# Patient Record
Sex: FEMALE | Race: WHITE | NOT HISPANIC OR LATINO | URBAN - METROPOLITAN AREA
[De-identification: names, ages, dates, MRNs, and addresses within clinical notes are randomized per-mention and may not be internally consistent; named-entity substitution may affect disease eponyms.]

---

## 2024-03-28 ENCOUNTER — EMERGENCY (EMERGENCY)
Facility: HOSPITAL | Age: 19
LOS: 1 days | Discharge: HOME | End: 2024-03-28
Attending: EMERGENCY MEDICINE | Admitting: EMERGENCY MEDICINE
Payer: COMMERCIAL

## 2024-03-28 VITALS
SYSTOLIC BLOOD PRESSURE: 105 MMHG | RESPIRATION RATE: 16 BRPM | HEART RATE: 72 BPM | TEMPERATURE: 98 F | OXYGEN SATURATION: 98 % | DIASTOLIC BLOOD PRESSURE: 80 MMHG

## 2024-03-28 LAB
ALBUMIN SERPL ELPH-MCNC: 4.1 G/DL — SIGNIFICANT CHANGE UP (ref 3.4–5)
ALP SERPL-CCNC: 63 U/L — SIGNIFICANT CHANGE UP (ref 40–120)
ALT FLD-CCNC: 18 U/L — SIGNIFICANT CHANGE UP (ref 12–42)
ANION GAP SERPL CALC-SCNC: 11 MMOL/L — SIGNIFICANT CHANGE UP (ref 9–16)
AST SERPL-CCNC: 19 U/L — SIGNIFICANT CHANGE UP (ref 15–37)
BASOPHILS # BLD AUTO: 0.05 K/UL — SIGNIFICANT CHANGE UP (ref 0–0.2)
BASOPHILS NFR BLD AUTO: 0.5 % — SIGNIFICANT CHANGE UP (ref 0–2)
BILIRUB SERPL-MCNC: 0.2 MG/DL — SIGNIFICANT CHANGE UP (ref 0.2–1.2)
BUN SERPL-MCNC: 13 MG/DL — SIGNIFICANT CHANGE UP (ref 7–23)
CALCIUM SERPL-MCNC: 8.7 MG/DL — SIGNIFICANT CHANGE UP (ref 8.5–10.5)
CHLORIDE SERPL-SCNC: 103 MMOL/L — SIGNIFICANT CHANGE UP (ref 96–108)
CO2 SERPL-SCNC: 26 MMOL/L — SIGNIFICANT CHANGE UP (ref 22–31)
CREAT SERPL-MCNC: 0.84 MG/DL — SIGNIFICANT CHANGE UP (ref 0.5–1.3)
EGFR: 102 ML/MIN/1.73M2 — SIGNIFICANT CHANGE UP
EOSINOPHIL # BLD AUTO: 0.05 K/UL — SIGNIFICANT CHANGE UP (ref 0–0.5)
EOSINOPHIL NFR BLD AUTO: 0.5 % — SIGNIFICANT CHANGE UP (ref 0–6)
ETHANOL SERPL-MCNC: 387 MG/DL — HIGH
GLUCOSE SERPL-MCNC: 113 MG/DL — HIGH (ref 70–99)
HCG SERPL-ACNC: <1 MIU/ML — SIGNIFICANT CHANGE UP
HCT VFR BLD CALC: 38.7 % — SIGNIFICANT CHANGE UP (ref 34.5–45)
HGB BLD-MCNC: 12.9 G/DL — SIGNIFICANT CHANGE UP (ref 11.5–15.5)
IMM GRANULOCYTES NFR BLD AUTO: 0.1 % — SIGNIFICANT CHANGE UP (ref 0–0.9)
LYMPHOCYTES # BLD AUTO: 3.65 K/UL — HIGH (ref 1–3.3)
LYMPHOCYTES # BLD AUTO: 37.5 % — SIGNIFICANT CHANGE UP (ref 13–44)
MCHC RBC-ENTMCNC: 29.1 PG — SIGNIFICANT CHANGE UP (ref 27–34)
MCHC RBC-ENTMCNC: 33.3 GM/DL — SIGNIFICANT CHANGE UP (ref 32–36)
MCV RBC AUTO: 87.2 FL — SIGNIFICANT CHANGE UP (ref 80–100)
MONOCYTES # BLD AUTO: 0.49 K/UL — SIGNIFICANT CHANGE UP (ref 0–0.9)
MONOCYTES NFR BLD AUTO: 5 % — SIGNIFICANT CHANGE UP (ref 2–14)
NEUTROPHILS # BLD AUTO: 5.49 K/UL — SIGNIFICANT CHANGE UP (ref 1.8–7.4)
NEUTROPHILS NFR BLD AUTO: 56.4 % — SIGNIFICANT CHANGE UP (ref 43–77)
NRBC # BLD: 0 /100 WBCS — SIGNIFICANT CHANGE UP (ref 0–0)
PLATELET # BLD AUTO: 234 K/UL — SIGNIFICANT CHANGE UP (ref 150–400)
POTASSIUM SERPL-MCNC: 3.1 MMOL/L — LOW (ref 3.5–5.3)
POTASSIUM SERPL-SCNC: 3.1 MMOL/L — LOW (ref 3.5–5.3)
PROT SERPL-MCNC: 7.9 G/DL — SIGNIFICANT CHANGE UP (ref 6.4–8.2)
RBC # BLD: 4.44 M/UL — SIGNIFICANT CHANGE UP (ref 3.8–5.2)
RBC # FLD: 12.3 % — SIGNIFICANT CHANGE UP (ref 10.3–14.5)
SODIUM SERPL-SCNC: 140 MMOL/L — SIGNIFICANT CHANGE UP (ref 132–145)
WBC # BLD: 9.74 K/UL — SIGNIFICANT CHANGE UP (ref 3.8–10.5)
WBC # FLD AUTO: 9.74 K/UL — SIGNIFICANT CHANGE UP (ref 3.8–10.5)

## 2024-03-28 PROCEDURE — 99223 1ST HOSP IP/OBS HIGH 75: CPT

## 2024-03-28 RX ORDER — ONDANSETRON 8 MG/1
4 TABLET, FILM COATED ORAL ONCE
Refills: 0 | Status: COMPLETED | OUTPATIENT
Start: 2024-03-28 | End: 2024-03-28

## 2024-03-28 RX ORDER — SODIUM CHLORIDE 9 MG/ML
1000 INJECTION INTRAMUSCULAR; INTRAVENOUS; SUBCUTANEOUS ONCE
Refills: 0 | Status: COMPLETED | OUTPATIENT
Start: 2024-03-28 | End: 2024-03-28

## 2024-03-28 RX ADMIN — ONDANSETRON 4 MILLIGRAM(S): 8 TABLET, FILM COATED ORAL at 21:11

## 2024-03-28 RX ADMIN — SODIUM CHLORIDE 1000 MILLILITER(S): 9 INJECTION INTRAMUSCULAR; INTRAVENOUS; SUBCUTANEOUS at 21:11

## 2024-03-28 NOTE — ED ADULT NURSE NOTE - OBJECTIVE STATEMENT
Pt BIBA for AMS. Pt is alert and awake but lethargic. Pt endorses EtOH use earlier today but denies drug use; As per pt's friend at bedside, pt had approximately 6 drinks earlier today. Pt actively vomiting during assessment. Pt denies having pain. Upon inspection, no obvious signs of injury present. PERRL. Respirations equal and unlabored. Pt offered bag for emesis. VSS

## 2024-03-28 NOTE — ED ADULT NURSE NOTE - DISCHARGE DATE/TIME
.      CERTIFICATE OF RETURN TO WORK    September 4, 2018      Re:   Bernard Melara  [address] [place] [id number]                        This is to certify that Bernard Melara has been under my care from 9/4/2018 and can return to regular work on 9/6/2018      SIGNATURE:___________________________________________,   9/4/2018      [facility]  [facility] [place]  [address] [phone]  Ext. [phone]  
29-Mar-2024 01:38

## 2024-03-28 NOTE — ED PROVIDER NOTE - PHYSICAL EXAMINATION
General: well developed, well nourished, no distress  Eyes: no scleral injection  Neck: non-tender, full range of motion, supple.   Respiratory: unlabored breathing  Cardiovascular: no central cyanosis  Musculoskeletal: normal gait.   Extremities: normal range of motion, non-tender.  Neurologic: alert, oriented to person, oriented to place, oriented to time.    Skin: normal color.  Negative For: rash  Psychiatric: normal affect, normal insight, normal concentration  Head is atraumatic, normocephalic. Pt is neurologically intact, no focal neurologic deficits. GCS = 15. no raccoon eyes/bell signs/hemotympanum noted. No focal tenderness on spinous processes of C-spine.

## 2024-03-28 NOTE — ED CDU PROVIDER INITIAL DAY NOTE - NSFOLLOWUPINSTRUCTIONS_ED_ALL_ED_FT
Alcohol Intoxication  Alcohol intoxication occurs when a person no longer thinks clearly or functions well (becomes impaired) after drinking alcohol. Intoxication can occur with even one drink. The level of impairment depends on:    The amount of alcohol the person had.  The person's age, gender, and weight.  How often the person drinks.  Whether the person has other medical conditions, such as diabetes, seizures, or a heart condition.    Alcohol intoxication can range in severity from mild to severe. The condition can be dangerous, especially when caused by drinking large amounts of alcohol in a short period of time (binge drinking) or if the person also took certain prescription medicines or recreational drugs.    What are the signs or symptoms?  Symptoms of mild alcohol intoxication include:    Feeling relaxed or sleepy.  Mild difficulty with:    Coordination.  Speech.  Memory.  Attention.      Symptoms of moderate alcohol intoxication include:    Extreme emotions, such as anger or sadness.  Moderate difficulty with:    Coordination.  Speech.  Memory.  Attention.      Symptoms of severe alcohol intoxication include:    Passing out.  Vomiting.  Confusion.  Slow breathing.  Coma.  Severe difficulty with:    Coordination.  Speech.  Memory.  Attention.      Intoxication, especially in people who are not exposed to alcohol often can progress from mild to severe quickly, and may even cause coma or death.    How is this diagnosed?  This condition may be diagnosed based on:    A medical history.  A physical exam.  A blood test that measures the concentration of alcohol in the blood (blood alcohol content, or RIRI).  Whether there is a smell of alcohol on the breath.    Your health care provider will ask you how much alcohol you drank and what kind of alcohol you had.    How is this treated?  Usually, treatment is not needed for this condition. Most of the effects of alcohol are temporary and go away as the alcohol naturally leaves the body. Your health care provider may recommend monitoring until the alcohol level starts to drop and it is safe to go home. You may also get fluids through an IV tube to help prevent dehydration. If the intoxication is severe, a breathing machine called a ventilator may be needed to support your breathing.    Follow these instructions at home:  Do not drive after drinking alcohol.  Have someone stay with you while you are intoxicated. You should not be left alone.  Stay hydrated. Drink enough fluid to keep your urine clear or pale yellow.  Avoid caffeine because it can dehydrate you.  ImageTake over-the-counter and prescription medicines only as told by your health care provider.  How is this prevented?  To prevent alcohol intoxication:    Limit alcohol intake to no more than 1 drink a day for nonpregnant women and 2 drinks a day for men. One drink equals 12 oz of beer, 5 oz of wine, or 1½ oz of hard liquor.  Do not drink alcohol on an empty stomach.  Avoid drinking alcohol if:    You are under the legal drinking age.  You are pregnant or may be pregnant.  You are taking medicines that should not be taken with alcohol.  Your drinking causes your medical condition to get worse.  You need to drive or perform activities that require your attention.  You have substance use disorder.      To prevent potentially serious complications of alcohol intoxication, seek immediate medical care if you or someone you know has signs of moderate or severe alcohol intoxication. These include:    Moderate or severe difficulty with:    Coordination.  Speech.  Memory.  Attention.    Passing out.  Confusion.  Vomiting.    Do not leave someone alone if he or she is intoxicated.    Contact a health care provider if:  You do not feel better after a few days.  You are having problems at work, at school, or at home due to drinking.  Get help right away if:  You become shaky when you try to stop drinking.  You shake uncontrollably (have a seizure).  You vomit blood. Blood in vomit may look bright red, or it may look like coffee grounds.  You have blood in your stool. Blood in stool may be bright red, or it may make stool appear black and tarry and make it smell bad.  You become light-headed or you faint.  If you ever feel like you may hurt yourself or others, or have thoughts about taking your own life, get help right away. You can go to your nearest emergency department or call:     Your local emergency services (911 in the [place]   A suicide crisis helpline, such as the National Suicide Prevention Lifeline at 1-194.107.2524. This is open 24 hours a day.     This information is not intended to replace advice given to you by your health care provider. Make sure you discuss any questions you have with your health care provider.

## 2024-03-28 NOTE — ED ADULT NURSE NOTE - NSFALLUNIVINTERV_ED_ALL_ED
Bed/Stretcher in lowest position, wheels locked, appropriate side rails in place/Call bell, personal items and telephone in reach/Instruct patient to call for assistance before getting out of bed/chair/stretcher/Non-slip footwear applied when patient is off stretcher/[place] to call system/Physically safe environment - no spills, clutter or unnecessary equipment/Purposeful proactive rounding/Room/bathroom lighting operational, light cord in reach

## 2024-03-28 NOTE — ED PROVIDER NOTE - OBJECTIVE STATEMENT
18-year-old female, no medical history, presents this emerged from for AMS.  Patient is responsive to very loud verbal stimuli, or cannot obtain history from patient.    Presents with her friend, who states that she drank a total of 6 shots, 4 of vodka and 2 of Sarah.  During this time she was also sipping on beer.  Friend states that they were pretty [place] somewhere, and then made their way to [place].  They got a ball, and patient was very intoxicated, could not stand, so the staff called 911.  Patient follows a couple times with the ambulance, however is not actively vomiting now.  Friend states that she did not hit her head, did not have any LOC.

## 2024-03-28 NOTE — ED PROVIDER NOTE - CLINICAL SUMMARY MEDICAL DECISION MAKING FREE TEXT BOX
18-year-old female presents this emergency room for public intoxication  Patient presents with her friend, who witnessed everything, and there was no head injury, head strike.  Patient is physical exam is unremarkable  Will monitor patient till clinical sobriety achieved.

## 2024-03-28 NOTE — ED ADULT TRIAGE NOTE - CHIEF COMPLAINT QUOTE
Pt BIBA c/o AMS. Admits to ETOH use tonight. Multiple eps of vomiting PTA, no obvious signs of trauma present.

## 2024-03-28 NOTE — ED PROVIDER NOTE - NSFOLLOWUPINSTRUCTIONS_ED_ALL_ED_FT
Alcohol Intoxication  Alcohol intoxication occurs when a person no longer thinks clearly or functions well (becomes impaired) after drinking alcohol. Intoxication can occur with even one drink. The level of impairment depends on:    The amount of alcohol the person had.  The person's age, gender, and weight.  How often the person drinks.  Whether the person has other medical conditions, such as diabetes, seizures, or a heart condition.    Alcohol intoxication can range in severity from mild to severe. The condition can be dangerous, especially when caused by drinking large amounts of alcohol in a short period of time (binge drinking) or if the person also took certain prescription medicines or recreational drugs.    What are the signs or symptoms?  Symptoms of mild alcohol intoxication include:    Feeling relaxed or sleepy.  Mild difficulty with:    Coordination.  Speech.  Memory.  Attention.      Symptoms of moderate alcohol intoxication include:    Extreme emotions, such as anger or sadness.  Moderate difficulty with:    Coordination.  Speech.  Memory.  Attention.      Symptoms of severe alcohol intoxication include:    Passing out.  Vomiting.  Confusion.  Slow breathing.  Coma.  Severe difficulty with:    Coordination.  Speech.  Memory.  Attention.      Intoxication, especially in people who are not exposed to alcohol often can progress from mild to severe quickly, and may even cause coma or death.    How is this diagnosed?  This condition may be diagnosed based on:    A medical history.  A physical exam.  A blood test that measures the concentration of alcohol in the blood (blood alcohol content, or RIRI).  Whether there is a smell of alcohol on the breath.    Your health care provider will ask you how much alcohol you drank and what kind of alcohol you had.    How is this treated?  Usually, treatment is not needed for this condition. Most of the effects of alcohol are temporary and go away as the alcohol naturally leaves the body. Your health care provider may recommend monitoring until the alcohol level starts to drop and it is safe to go home. You may also get fluids through an IV tube to help prevent dehydration. If the intoxication is severe, a breathing machine called a ventilator may be needed to support your breathing.    Follow these instructions at home:  Do not drive after drinking alcohol.  Have someone stay with you while you are intoxicated. You should not be left alone.  Stay hydrated. Drink enough fluid to keep your urine clear or pale yellow.  Avoid caffeine because it can dehydrate you.  ImageTake over-the-counter and prescription medicines only as told by your health care provider.  How is this prevented?  To prevent alcohol intoxication:    Limit alcohol intake to no more than 1 drink a day for nonpregnant women and 2 drinks a day for men. One drink equals 12 oz of beer, 5 oz of wine, or 1½ oz of hard liquor.  Do not drink alcohol on an empty stomach.  Avoid drinking alcohol if:    You are under the legal drinking age.  You are pregnant or may be pregnant.  You are taking medicines that should not be taken with alcohol.  Your drinking causes your medical condition to get worse.  You need to drive or perform activities that require your attention.  You have substance use disorder.      To prevent potentially serious complications of alcohol intoxication, seek immediate medical care if you or someone you know has signs of moderate or severe alcohol intoxication. These include:    Moderate or severe difficulty with:    Coordination.  Speech.  Memory.  Attention.    Passing out.  Confusion.  Vomiting.    Do not leave someone alone if he or she is intoxicated.    Contact a health care provider if:  You do not feel better after a few days.  You are having problems at work, at school, or at home due to drinking.  Get help right away if:  You become shaky when you try to stop drinking.  You shake uncontrollably (have a seizure).  You vomit blood. Blood in vomit may look bright red, or it may look like coffee grounds.  You have blood in your stool. Blood in stool may be bright red, or it may make stool appear black and tarry and make it smell bad.  You become light-headed or you faint.  If you ever feel like you may hurt yourself or others, or have thoughts about taking your own life, get help right away. You can go to your nearest emergency department or call:     Your local emergency services (911 in the [place]   A suicide crisis helpline, such as the National Suicide Prevention Lifeline at 1-661.481.2299. This is open 24 hours a day.     This information is not intended to replace advice given to you by your health care provider. Make sure you discuss any questions you have with your health care provider.

## 2024-03-28 NOTE — ED PROVIDER NOTE - NS ED ATTENDING STATEMENT MOD
I have seen and examined this patient and fully participated in the care of this patient as the teaching attending.  The service was shared with the LANNY.  I reviewed and verified the documentation. 05-Sep-2022 11:07

## 2024-03-29 VITALS
RESPIRATION RATE: 16 BRPM | DIASTOLIC BLOOD PRESSURE: 62 MMHG | SYSTOLIC BLOOD PRESSURE: 102 MMHG | TEMPERATURE: 97 F | OXYGEN SATURATION: 99 % | HEART RATE: 76 BPM

## 2024-03-29 NOTE — ED ADULT NURSE REASSESSMENT NOTE - NS ED NURSE REASSESS COMMENT FT1
Patient left ed prior to receiving dc papers.
Received sign out from outgoing RN. Pt resting on stretcher, respirations even and unlabored. Pt intoxicated and responsive to painful stimuli. No distress noted. Bed alarm on.
Pt ambulatory to the bathroom with assistance. Alert and oriented x3, answering questions appropriately. Juanis APPIAH made aware.

## 2024-03-29 NOTE — ED CDU PROVIDER DISPOSITION NOTE - PATIENT PORTAL LINK FT
You can access the FollowMyHealth Patient Portal offered by [facility] by registering at the following website: [email or web address]. By joining [facility]’s FollowMyHealth portal, you will also be able to view your health information using other applications (apps) compatible with our system.

## 2024-04-01 DIAGNOSIS — R41.82 ALTERED MENTAL STATUS, UNSPECIFIED: ICD-10-CM

## 2024-04-01 DIAGNOSIS — F10.129 ALCOHOL ABUSE WITH INTOXICATION, UNSPECIFIED: ICD-10-CM
